# Patient Record
Sex: FEMALE | Race: BLACK OR AFRICAN AMERICAN | NOT HISPANIC OR LATINO | Employment: FULL TIME | URBAN - METROPOLITAN AREA
[De-identification: names, ages, dates, MRNs, and addresses within clinical notes are randomized per-mention and may not be internally consistent; named-entity substitution may affect disease eponyms.]

---

## 2023-10-09 DIAGNOSIS — E66.9 CLASS 2 OBESITY WITHOUT SERIOUS COMORBIDITY WITH BODY MASS INDEX (BMI) OF 35.0 TO 35.9 IN ADULT, UNSPECIFIED OBESITY TYPE: Primary | ICD-10-CM

## 2023-10-09 RX ORDER — PHENTERMINE HYDROCHLORIDE 37.5 MG/1
37.5 TABLET ORAL DAILY
Qty: 30 TABLET | Refills: 0 | Status: SHIPPED | OUTPATIENT
Start: 2023-10-09 | End: 2024-02-21 | Stop reason: SDUPTHER

## 2023-10-10 ENCOUNTER — PHARMACY VISIT (OUTPATIENT)
Dept: PHARMACY | Facility: CLINIC | Age: 53
End: 2023-10-10

## 2023-10-11 ENCOUNTER — PHARMACY VISIT (OUTPATIENT)
Dept: PHARMACY | Facility: CLINIC | Age: 53
End: 2023-10-11
Payer: COMMERCIAL

## 2023-10-11 PROCEDURE — RXMED WILLOW AMBULATORY MEDICATION CHARGE

## 2023-11-01 ENCOUNTER — PHARMACY VISIT (OUTPATIENT)
Dept: PHARMACY | Facility: CLINIC | Age: 53
End: 2023-11-01
Payer: COMMERCIAL

## 2023-11-01 DIAGNOSIS — N30.00 ACUTE CYSTITIS WITHOUT HEMATURIA: Primary | ICD-10-CM

## 2023-11-01 PROCEDURE — RXMED WILLOW AMBULATORY MEDICATION CHARGE

## 2023-11-01 RX ORDER — SULFAMETHOXAZOLE AND TRIMETHOPRIM 800; 160 MG/1; MG/1
1 TABLET ORAL 2 TIMES DAILY
Qty: 6 TABLET | Refills: 0 | Status: SHIPPED | OUTPATIENT
Start: 2023-11-01 | End: 2023-11-04

## 2023-11-11 PROBLEM — H52.203 ASTIGMATISM, BILATERAL: Status: ACTIVE | Noted: 2023-11-11

## 2023-11-11 PROBLEM — R73.03 PREDIABETES: Status: ACTIVE | Noted: 2023-11-11

## 2023-11-11 PROBLEM — E89.40 SURGICAL MENOPAUSE, ASYMPTOMATIC: Status: ACTIVE | Noted: 2023-11-11

## 2023-11-11 PROBLEM — L81.9 DISORDER OF PIGMENTATION, UNSPECIFIED: Status: ACTIVE | Noted: 2021-05-26

## 2023-11-11 PROBLEM — H52.03 HYPERMETROPIA OF BOTH EYES: Status: ACTIVE | Noted: 2023-11-11

## 2023-11-11 PROBLEM — R92.1 BREAST CALCIFICATION, RIGHT: Status: ACTIVE | Noted: 2023-11-11

## 2023-11-11 PROBLEM — E53.8 VITAMIN B12 DEFICIENCY: Status: ACTIVE | Noted: 2023-11-11

## 2023-11-11 PROBLEM — H52.4 BILATERAL PRESBYOPIA: Status: ACTIVE | Noted: 2023-11-11

## 2023-11-11 PROBLEM — E55.9 VITAMIN D DEFICIENCY: Status: ACTIVE | Noted: 2023-11-11

## 2023-11-11 PROBLEM — L44.1 LICHEN NITIDUS: Status: ACTIVE | Noted: 2021-05-26

## 2023-11-11 PROBLEM — M25.372 INSTABILITY OF LEFT ANKLE JOINT: Status: ACTIVE | Noted: 2023-11-11

## 2023-11-11 PROBLEM — R92.8 ABNORMAL FINDING ON MAMMOGRAPHY: Status: ACTIVE | Noted: 2023-11-11

## 2023-11-11 PROBLEM — D64.9 ANEMIA: Status: ACTIVE | Noted: 2023-11-11

## 2023-11-11 PROBLEM — K91.2 MALNUTRITION FOLLOWING GASTROINTESTINAL SURGERY (HHS-HCC): Status: ACTIVE | Noted: 2023-11-11

## 2023-11-11 PROBLEM — E66.9 OBESITY (BMI 30-39.9): Status: ACTIVE | Noted: 2023-11-11

## 2023-11-11 PROBLEM — R63.2 POLYPHAGIA: Status: ACTIVE | Noted: 2023-11-11

## 2023-11-11 PROBLEM — L81.1 CHLOASMA: Status: ACTIVE | Noted: 2021-05-26

## 2023-11-11 RX ORDER — LEVONORGESTREL 52 MG/1
INTRAUTERINE DEVICE INTRAUTERINE
COMMUNITY
End: 2023-11-13 | Stop reason: ALTCHOICE

## 2023-11-11 RX ORDER — OMEPRAZOLE 20 MG/1
1 CAPSULE, DELAYED RELEASE ORAL DAILY PRN
COMMUNITY

## 2023-11-11 RX ORDER — CALCIUM CARBONATE 200(500)MG
1 TABLET,CHEWABLE ORAL AS NEEDED
COMMUNITY

## 2023-11-11 RX ORDER — TRIAMCINOLONE ACETONIDE 1 MG/G
1 CREAM TOPICAL
COMMUNITY
Start: 2021-05-26

## 2023-11-11 RX ORDER — METFORMIN HYDROCHLORIDE 500 MG/1
1 TABLET ORAL EVERY 12 HOURS
COMMUNITY
Start: 2022-07-27

## 2023-11-11 RX ORDER — IBUPROFEN 200 MG
2 TABLET ORAL AS NEEDED
COMMUNITY
Start: 2019-09-26

## 2023-11-11 RX ORDER — ESTRADIOL 0.03 MG/D
FILM, EXTENDED RELEASE TRANSDERMAL 2 TIMES WEEKLY
COMMUNITY
Start: 2019-09-26 | End: 2023-11-13 | Stop reason: ALTCHOICE

## 2023-11-11 RX ORDER — ACETAMINOPHEN 500 MG
1 TABLET ORAL DAILY
COMMUNITY
Start: 2019-11-14

## 2023-11-11 RX ORDER — ERGOCALCIFEROL 1.25 MG/1
1 CAPSULE ORAL
COMMUNITY
Start: 2021-04-13

## 2023-11-11 RX ORDER — SEMAGLUTIDE 1.34 MG/ML
0.5 INJECTION, SOLUTION SUBCUTANEOUS
COMMUNITY
Start: 2022-08-09

## 2023-11-11 RX ORDER — ACETAMINOPHEN 325 MG/1
2 TABLET ORAL EVERY 6 HOURS PRN
COMMUNITY
Start: 2019-09-26

## 2023-11-13 ENCOUNTER — OFFICE VISIT (OUTPATIENT)
Dept: OBSTETRICS AND GYNECOLOGY | Facility: HOSPITAL | Age: 53
End: 2023-11-13
Payer: COMMERCIAL

## 2023-11-13 VITALS
DIASTOLIC BLOOD PRESSURE: 88 MMHG | SYSTOLIC BLOOD PRESSURE: 138 MMHG | BODY MASS INDEX: 37.22 KG/M2 | WEIGHT: 230.6 LBS | HEART RATE: 108 BPM

## 2023-11-13 DIAGNOSIS — Z01.419 ENCOUNTER FOR GYNECOLOGICAL EXAMINATION WITHOUT ABNORMAL FINDING: Primary | ICD-10-CM

## 2023-11-13 DIAGNOSIS — Z12.31 SCREENING MAMMOGRAM FOR BREAST CANCER: ICD-10-CM

## 2023-11-13 DIAGNOSIS — Z12.4 SCREENING FOR MALIGNANT NEOPLASM OF CERVIX: ICD-10-CM

## 2023-11-13 DIAGNOSIS — Z30.432 ENCOUNTER FOR IUD REMOVAL: ICD-10-CM

## 2023-11-13 PROCEDURE — 99396 PREV VISIT EST AGE 40-64: CPT | Performed by: OBSTETRICS & GYNECOLOGY

## 2023-11-13 PROCEDURE — 58301 REMOVE INTRAUTERINE DEVICE: CPT | Performed by: OBSTETRICS & GYNECOLOGY

## 2023-11-13 PROCEDURE — 1036F TOBACCO NON-USER: CPT | Performed by: OBSTETRICS & GYNECOLOGY

## 2023-11-13 PROCEDURE — 3008F BODY MASS INDEX DOCD: CPT | Performed by: OBSTETRICS & GYNECOLOGY

## 2023-11-13 PROCEDURE — 99386 PREV VISIT NEW AGE 40-64: CPT | Performed by: OBSTETRICS & GYNECOLOGY

## 2023-11-13 PROCEDURE — 87624 HPV HI-RISK TYP POOLED RSLT: CPT | Performed by: OBSTETRICS & GYNECOLOGY

## 2023-11-13 PROCEDURE — 88175 CYTOPATH C/V AUTO FLUID REDO: CPT | Mod: TC,GCY | Performed by: OBSTETRICS & GYNECOLOGY

## 2023-11-13 SDOH — ECONOMIC STABILITY: FOOD INSECURITY: WITHIN THE PAST 12 MONTHS, THE FOOD YOU BOUGHT JUST DIDN'T LAST AND YOU DIDN'T HAVE MONEY TO GET MORE.: NEVER TRUE

## 2023-11-13 SDOH — ECONOMIC STABILITY: FOOD INSECURITY: WITHIN THE PAST 12 MONTHS, YOU WORRIED THAT YOUR FOOD WOULD RUN OUT BEFORE YOU GOT MONEY TO BUY MORE.: NEVER TRUE

## 2023-11-13 ASSESSMENT — ENCOUNTER SYMPTOMS
CONSTITUTIONAL NEGATIVE: 0
CARDIOVASCULAR NEGATIVE: 0
PSYCHIATRIC NEGATIVE: 0
MUSCULOSKELETAL NEGATIVE: 0
HEMATOLOGIC/LYMPHATIC NEGATIVE: 0
NEUROLOGICAL NEGATIVE: 0
ENDOCRINE NEGATIVE: 0
ALLERGIC/IMMUNOLOGIC NEGATIVE: 0
RESPIRATORY NEGATIVE: 0
GASTROINTESTINAL NEGATIVE: 0
EYES NEGATIVE: 0

## 2023-11-13 ASSESSMENT — PATIENT HEALTH QUESTIONNAIRE - PHQ9
SUM OF ALL RESPONSES TO PHQ9 QUESTIONS 1 & 2: 0
2. FEELING DOWN, DEPRESSED OR HOPELESS: NOT AT ALL
1. LITTLE INTEREST OR PLEASURE IN DOING THINGS: NOT AT ALL

## 2023-11-13 ASSESSMENT — PAIN SCALES - GENERAL: PAINLEVEL: 0-NO PAIN

## 2023-11-13 NOTE — PROGRESS NOTES
Subjective   Patient ID: Bhavana Soria is a 53 y.o. female who presents for Annual Exam (Pt here for annual exam/Last pap 2/20/2019/Pt desires IUD removed placed in 2014 /Pt denies pain /Pt denies falls /).  53 year old patient, presents for annual gyn exam.   Wants IUD removed today. Placed 2014 (Mirena).  Due for Pap.  Hx of BSO for benign tumors.   Son just got out of service.         Review of Systems   All other systems reviewed and are negative.      Objective   Physical Exam  Vitals reviewed.   Constitutional:       Appearance: Normal appearance.   HENT:      Head: Normocephalic and atraumatic.      Right Ear: External ear normal.      Left Ear: External ear normal.      Nose: Nose normal.      Mouth/Throat:      Mouth: Mucous membranes are moist.   Eyes:      Extraocular Movements: Extraocular movements intact.   Neck:      Thyroid: No thyroid mass or thyromegaly.   Cardiovascular:      Rate and Rhythm: Normal rate and regular rhythm.      Heart sounds: Normal heart sounds.   Pulmonary:      Effort: Pulmonary effort is normal.      Breath sounds: Normal breath sounds.   Chest:   Breasts:     Right: Normal.      Left: Normal.   Abdominal:      General: Abdomen is flat. Bowel sounds are normal.      Palpations: Abdomen is soft.      Tenderness: There is no abdominal tenderness. There is no right CVA tenderness or left CVA tenderness.   Genitourinary:     General: Normal vulva.      Exam position: Lithotomy position.      Labia:         Right: No lesion.         Left: No lesion.       Urethra: No prolapse, urethral swelling or urethral lesion.      Vagina: Normal.      Cervix: Normal.      Uterus: Normal.       Adnexa: Right adnexa normal and left adnexa normal.   Musculoskeletal:         General: Normal range of motion.      Right shoulder: Normal.      Left shoulder: Normal.      Cervical back: Normal, normal range of motion and neck supple.      Thoracic back: Normal.      Lumbar back: Normal.      Right  hip: Normal.      Left hip: Normal.      Right upper leg: Normal.      Left upper leg: Normal.      Right knee: Normal.      Left knee: Normal.      Right lower leg: Normal.      Left lower leg: Normal.   Lymphadenopathy:      Upper Body:      Right upper body: No axillary adenopathy.      Left upper body: No axillary adenopathy.      Lower Body: No right inguinal adenopathy. No left inguinal adenopathy.   Skin:     General: Skin is warm and dry.   Neurological:      General: No focal deficit present.      Mental Status: She is alert and oriented to person, place, and time.      Cranial Nerves: Cranial nerves 2-12 are intact.      Motor: Motor function is intact.   Psychiatric:         Attention and Perception: Attention and perception normal.         Mood and Affect: Mood and affect normal.         Behavior: Behavior normal.         Cognition and Memory: Cognition normal.         Judgment: Judgment normal.     Patient ID: Bhavana Soria is a 53 y.o. female.    IUD Removal    Date/Time: 11/13/2023 12:33 PM    Performed by: Megan Avalos MD  Authorized by: Megan Avalos MD    Consent:     Consent obtained:  Written    Consent given by:  Patient    Procedure risks and benefits discussed: yes      Patient questions answered: yes      Patient agrees, verbalizes understanding, and wants to proceed: yes      Instructions and paperwork completed: yes    Universal protocol:     Patient states understanding of procedure being performed: yes      Relevant documents present and verified: yes      Required blood products, implants, devices, and special equipment available: yes    Procedure:     Removed with no complications: yes      Removal due to mechanical complications of IUD: no      Removal due to infection and inflammatory reaction: no        Assessment/Plan   Problem List Items Addressed This Visit    None  Visit Diagnoses         Codes    Encounter for gynecological examination without abnormal  finding    -  Primary Z01.419    Screening mammogram for breast cancer     Z12.31    Relevant Orders    BI mammo bilateral screening tomosynthesis    Screening for malignant neoplasm of cervix     Z12.4    Relevant Orders    THINPREP PAP TEST    Encounter for IUD removal     Z30.432    Relevant Orders    IUD Removal

## 2023-12-01 LAB
CYTOLOGY CMNT CVX/VAG CYTO-IMP: NORMAL
HPV HR 12 DNA GENITAL QL NAA+PROBE: NEGATIVE
HPV HR GENOTYPES PNL CVX NAA+PROBE: NEGATIVE
HPV16 DNA SPEC QL NAA+PROBE: NEGATIVE
HPV18 DNA SPEC QL NAA+PROBE: NEGATIVE
LAB AP CONTRACEPTIVE HISTORY: NORMAL
LAB AP HPV GENOTYPE QUESTION: YES
LAB AP HPV HR: NORMAL
LABORATORY COMMENT REPORT: NORMAL
MENSTRUAL HX REPORTED: NORMAL
PATH REPORT.TOTAL CANCER: NORMAL

## 2024-02-21 ENCOUNTER — TELEMEDICINE (OUTPATIENT)
Dept: PRIMARY CARE | Facility: CLINIC | Age: 54
End: 2024-02-21
Payer: COMMERCIAL

## 2024-02-21 ENCOUNTER — PHARMACY VISIT (OUTPATIENT)
Dept: PHARMACY | Facility: CLINIC | Age: 54
End: 2024-02-21

## 2024-02-21 VITALS — BODY MASS INDEX: 37.22 KG/M2 | HEIGHT: 66 IN

## 2024-02-21 DIAGNOSIS — E66.9 CLASS 2 OBESITY WITHOUT SERIOUS COMORBIDITY WITH BODY MASS INDEX (BMI) OF 35.0 TO 35.9 IN ADULT, UNSPECIFIED OBESITY TYPE: ICD-10-CM

## 2024-02-21 DIAGNOSIS — E66.9 OBESITY (BMI 30-39.9): Primary | ICD-10-CM

## 2024-02-21 PROCEDURE — 1036F TOBACCO NON-USER: CPT | Performed by: FAMILY MEDICINE

## 2024-02-21 PROCEDURE — RXMED WILLOW AMBULATORY MEDICATION CHARGE

## 2024-02-21 PROCEDURE — 99213 OFFICE O/P EST LOW 20 MIN: CPT | Performed by: FAMILY MEDICINE

## 2024-02-21 PROCEDURE — 3008F BODY MASS INDEX DOCD: CPT | Performed by: FAMILY MEDICINE

## 2024-02-21 RX ORDER — PHENTERMINE HYDROCHLORIDE 37.5 MG/1
37.5 TABLET ORAL DAILY
Qty: 30 TABLET | Refills: 0 | Status: SHIPPED | OUTPATIENT
Start: 2024-02-21 | End: 2024-03-19 | Stop reason: SDUPTHER

## 2024-02-21 RX ORDER — ELECTROLYTES/DEXTROSE
SOLUTION, ORAL ORAL
COMMUNITY

## 2024-02-21 ASSESSMENT — ENCOUNTER SYMPTOMS
DEPRESSION: 0
LOSS OF SENSATION IN FEET: 0
OCCASIONAL FEELINGS OF UNSTEADINESS: 0

## 2024-02-21 NOTE — PROGRESS NOTES
Chief Complaint:  No chief complaint on file.  History Of Present Illness:   Bhavana Soria is a 53 y.o. female        Bhavana Soria is a 53 year old female  employee presenting for obesity.            B12: she's been seen in the past for B12 injections.           Weight: on phentermine from bariatric team. tried saxenda in the past. Aug 9, 2022 update: she tried metformin, but was having some GI side effects. agreed to start ozempic rx. 2023 update: ozempic not working. agreed to restart phentermine for 3 months.   2023 update: weight lowest 223 recently, maintaining up to 227 lbs. agreed on this month plus one more month only this year for refill. discussed possible blood pressure and cardiac side effects, pt voiced understanding and awareness. 2024 update: 228-230 lbs. She wasn't to lose weight, she's been more active recently. Agreed to restart phenteramine for another 4 months max.      Prediabetes: HgA1c: 6.2% 2022.      Cholesterol: mildly elevated.      PMH: , 3 ectopic pregnancies. obesity.      PSH: partial hysterectomy, ectopic pregnancies.      SH: works for [employer]. smoked for about 22 years, nonsmoker now.      FH: mother: A fib. unknown dad's side. HTN. cholesterol. lymphedema.      COVID: vaccinated. never tested positive.      Colonoscopy: never had one.                        Review of Systems:     Negative  Constitutional:   - fever   - chills   - night sweats  - unexpected weight change  - changes in sleep     Eyes:   - loss of vision  - double vision  - floaters     Ear/Nose/Throat/Mouth:   - sore throat  - hearing changes  - sinus congestion     Cardiovascular:   - chest pain  - chest heaviness  - palpitations  - swelling in ankles     Musculoskeletal:   - bone pain  - muscle pain  - joint pain     Respiratory:   - shortness of breath  - difficulty breathing  - frequent cough  - wheezing     Neurological:   - loss of consciousness  -  "tremors  - dizzy spells  - numbness   - tingling     Gastrointestinal:   - abdominal pain  - nausea  - vomiting  - constipation  - diarrhea  - bloody stools  - loss of bowel control  - indigestion  - heartburn  - sour taste in throat when waking up     Genitourinary:   - urinary incontinence  - increased urinary frequency  - painful urination  - blood in urine     Skin:   - Rash  - lumps or bumps  - worrisome moles     Endocrine:   - excessive thirst  - feeling too hot  - feeling too cold  - feeling tired  - fatigue      Hematologic/Lymphatic:   - swollen glands  - blood clotting problems  - easy bruising.     Psychological:   - feelings of depression  - feelings of anxiety.     Sexual:   - sexual health concerns        Positive  Psychological:   - feeling generally happy  - feeling safe at home            Last Recorded Vitals:  Vitals:    02/21/24 1022   Height: 1.676 m (5' 6\")        Past Medical History:  She has a past medical history of Cutaneous abscess of right axilla (11/28/2016), Cutaneous abscess, unspecified (11/28/2016), Encounter for other preprocedural examination (07/30/2019), Encounter for pregnancy test, result negative (02/20/2019), Encounter for screening for infections with a predominantly sexual mode of transmission (03/19/2018), Encounter for screening for infections with a predominantly sexual mode of transmission (03/17/2017), Inappropriate diet and eating habits (11/30/2018), Other problems related to lifestyle, Other specified conditions associated with female genital organs and menstrual cycle (08/05/2019), Personal history of other diseases of the female genital tract (07/05/2019), Personal history of other medical treatment, Personal history of other specified conditions (11/07/2019), Solitary cyst of left breast (04/19/2017), Sprain of ligaments of lumbar spine, initial encounter (11/28/2016), Sprain of ligaments of lumbar spine, initial encounter (11/28/2016), Unspecified ovarian cyst, " "right side (02/20/2019), and Unspecified symptoms and signs involving the genitourinary system (04/23/2020).     Past Surgical History:  She has a past surgical history that includes Other surgical history (03/17/2017) and Other surgical history (10/10/2019).     Social History:  She reports that she has never smoked. She has never used smokeless tobacco. No history on file for alcohol use and drug use.     Family History:  Family History   Problem Relation Name Age of Onset    Atrial fibrillation Mother      Hyperlipidemia Mother      Hypertension Mother      Thyroid disease Mother       Allergies:  Penicillins and Sulfa (sulfonamide antibiotics)     Outpatient Medications:  Current Outpatient Medications   Medication Instructions    acetaminophen (Tylenol) 325 mg tablet 2 tablets, oral, Every 6 hours PRN    calcium carbonate (Tums) 200 mg calcium chewable tablet 1 tablet, oral, As needed    cetirizine (ZyrTEC) 10 mg capsule 1 capsule, oral, Daily PRN    cholecalciferol (Vitamin D-3) 50 mcg (2,000 unit) capsule 1 capsule, oral, Daily    ergocalciferol (Vitamin D-2) 1.25 MG (70951 UT) capsule 1 capsule, oral, Weekly    ibuprofen (Motrin IB) 200 mg tablet 2 tablets, oral, As needed    metFORMIN (Glucophage) 500 mg tablet 1 tablet, oral, Every 12 hours    omeprazole (PriLOSEC) 20 mg DR capsule 1 capsule, oral, Daily PRN    Ozempic 0.5 mg, subcutaneous, Weekly    phentermine (Adipex-P) 37.5 mg tablet TAKE 1 TABLET BY MOUTH ONCE DAILY    syringe with needle 3 mL 23 x 1\" syringe USE AS DIRECTED for B12 injection    triamcinolone (Kenalog) 0.1 % cream 1 Application, Topical        Physical Exam:  GENERAL: Well developed, well nourished, alert and cooperative, and appears to be in no acute distress.     PSYCH: mood pleasant and appropriate     LUNGS: Good respiratory effort.     GAIT: Normal         Assessment/Plan   Problem List Items Addressed This Visit             ICD-10-CM    Obesity (BMI 30-39.9) - Primary E66.9 "     Other Visit Diagnoses         Codes    Class 2 obesity without serious comorbidity with body mass index (BMI) of 35.0 to 35.9 in adult, unspecified obesity type     E66.9, Z68.35            Agreed to restart max 4 month trial phentermine.    30 days sent to pharmacy.     Follow up as needed.         Home Chin, DO

## 2024-03-18 ENCOUNTER — PHARMACY VISIT (OUTPATIENT)
Dept: PHARMACY | Facility: CLINIC | Age: 54
End: 2024-03-18
Payer: COMMERCIAL

## 2024-03-18 PROCEDURE — 88305 TISSUE EXAM BY PATHOLOGIST: CPT

## 2024-03-18 PROCEDURE — 88305 TISSUE EXAM BY PATHOLOGIST: CPT | Performed by: PATHOLOGY

## 2024-03-18 PROCEDURE — RXMED WILLOW AMBULATORY MEDICATION CHARGE

## 2024-03-18 RX ORDER — ERYTHROMYCIN 5 MG/G
OINTMENT OPHTHALMIC
Qty: 3.5 G | Refills: 0 | OUTPATIENT
Start: 2024-03-18

## 2024-03-19 DIAGNOSIS — E66.9 CLASS 2 OBESITY WITHOUT SERIOUS COMORBIDITY WITH BODY MASS INDEX (BMI) OF 35.0 TO 35.9 IN ADULT, UNSPECIFIED OBESITY TYPE: ICD-10-CM

## 2024-03-19 RX ORDER — PHENTERMINE HYDROCHLORIDE 37.5 MG/1
37.5 TABLET ORAL DAILY
Qty: 30 TABLET | Refills: 0 | Status: SHIPPED | OUTPATIENT
Start: 2024-03-19 | End: 2024-04-14 | Stop reason: SDUPTHER

## 2024-03-20 ENCOUNTER — LAB REQUISITION (OUTPATIENT)
Dept: LAB | Facility: HOSPITAL | Age: 54
End: 2024-03-20
Payer: COMMERCIAL

## 2024-03-20 ENCOUNTER — PHARMACY VISIT (OUTPATIENT)
Dept: PHARMACY | Facility: CLINIC | Age: 54
End: 2024-03-20

## 2024-03-20 DIAGNOSIS — D48.5 NEOPLASM OF UNCERTAIN BEHAVIOR OF SKIN: ICD-10-CM

## 2024-03-20 PROCEDURE — RXMED WILLOW AMBULATORY MEDICATION CHARGE

## 2024-03-25 LAB
LABORATORY COMMENT REPORT: NORMAL
PATH REPORT.FINAL DX SPEC: NORMAL
PATH REPORT.GROSS SPEC: NORMAL
PATH REPORT.RELEVANT HX SPEC: NORMAL
PATH REPORT.TOTAL CANCER: NORMAL

## 2024-04-14 DIAGNOSIS — E66.9 CLASS 2 OBESITY WITHOUT SERIOUS COMORBIDITY WITH BODY MASS INDEX (BMI) OF 35.0 TO 35.9 IN ADULT, UNSPECIFIED OBESITY TYPE: ICD-10-CM

## 2024-04-17 RX ORDER — PHENTERMINE HYDROCHLORIDE 37.5 MG/1
37.5 TABLET ORAL DAILY
Qty: 30 TABLET | Refills: 0 | Status: SHIPPED | OUTPATIENT
Start: 2024-04-17 | End: 2024-05-14 | Stop reason: SDUPTHER

## 2024-04-18 ENCOUNTER — PHARMACY VISIT (OUTPATIENT)
Dept: PHARMACY | Facility: CLINIC | Age: 54
End: 2024-04-18

## 2024-04-18 PROCEDURE — RXMED WILLOW AMBULATORY MEDICATION CHARGE

## 2024-05-14 DIAGNOSIS — E66.9 CLASS 2 OBESITY WITHOUT SERIOUS COMORBIDITY WITH BODY MASS INDEX (BMI) OF 35.0 TO 35.9 IN ADULT, UNSPECIFIED OBESITY TYPE: ICD-10-CM

## 2024-05-14 RX ORDER — PHENTERMINE HYDROCHLORIDE 37.5 MG/1
37.5 TABLET ORAL DAILY
Qty: 30 TABLET | Refills: 0 | Status: SHIPPED | OUTPATIENT
Start: 2024-05-14 | End: 2024-06-16

## 2024-05-17 ENCOUNTER — PHARMACY VISIT (OUTPATIENT)
Dept: PHARMACY | Facility: CLINIC | Age: 54
End: 2024-05-17

## 2024-05-17 PROCEDURE — RXMED WILLOW AMBULATORY MEDICATION CHARGE

## 2024-08-29 ENCOUNTER — APPOINTMENT (OUTPATIENT)
Dept: PRIMARY CARE | Facility: CLINIC | Age: 54
End: 2024-08-29
Payer: COMMERCIAL

## 2024-08-29 VITALS
DIASTOLIC BLOOD PRESSURE: 86 MMHG | HEART RATE: 80 BPM | BODY MASS INDEX: 35.81 KG/M2 | SYSTOLIC BLOOD PRESSURE: 134 MMHG | WEIGHT: 222.8 LBS | HEIGHT: 66 IN

## 2024-08-29 DIAGNOSIS — Z00.00 WELL ADULT EXAM: Primary | ICD-10-CM

## 2024-08-29 LAB
ERYTHROCYTE [DISTWIDTH] IN BLOOD BY AUTOMATED COUNT: 16.4 % (ref 11.5–14.5)
EST. AVERAGE GLUCOSE BLD GHB EST-MCNC: 120 MG/DL
HBA1C MFR BLD: 5.8 %
HCT VFR BLD AUTO: 40.1 % (ref 36–46)
HGB BLD-MCNC: 11.8 G/DL (ref 12–16)
MCH RBC QN AUTO: 22 PG (ref 26–34)
MCHC RBC AUTO-ENTMCNC: 29.4 G/DL (ref 32–36)
MCV RBC AUTO: 75 FL (ref 80–100)
NRBC BLD-RTO: 0 /100 WBCS (ref 0–0)
PLATELET # BLD AUTO: 297 X10*3/UL (ref 150–450)
RBC # BLD AUTO: 5.36 X10*6/UL (ref 4–5.2)
WBC # BLD AUTO: 5 X10*3/UL (ref 4.4–11.3)

## 2024-08-29 PROCEDURE — 83036 HEMOGLOBIN GLYCOSYLATED A1C: CPT

## 2024-08-29 PROCEDURE — 99396 PREV VISIT EST AGE 40-64: CPT | Performed by: FAMILY MEDICINE

## 2024-08-29 PROCEDURE — 84443 ASSAY THYROID STIM HORMONE: CPT

## 2024-08-29 PROCEDURE — 1036F TOBACCO NON-USER: CPT | Performed by: FAMILY MEDICINE

## 2024-08-29 PROCEDURE — 80061 LIPID PANEL: CPT

## 2024-08-29 PROCEDURE — 80053 COMPREHEN METABOLIC PANEL: CPT

## 2024-08-29 PROCEDURE — 3008F BODY MASS INDEX DOCD: CPT | Performed by: FAMILY MEDICINE

## 2024-08-29 PROCEDURE — 85027 COMPLETE CBC AUTOMATED: CPT

## 2024-08-29 ASSESSMENT — ENCOUNTER SYMPTOMS
DEPRESSION: 0
LOSS OF SENSATION IN FEET: 0
OCCASIONAL FEELINGS OF UNSTEADINESS: 0

## 2024-08-29 NOTE — PROGRESS NOTES
Pt is here for annual, concerns of resuming Phentermine.       Chief Complaint:  No chief complaint on file.  History Of Present Illness:   Bhavana Soria is a 54 y.o. 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            B12: she's been seen in the past for B12 injections.            Weight: on phentermine from bariatric team. tried saxenda in the past. Aug 9, 2022 update: she tried metformin, but was having some GI side effects. agreed to start ozempic rx. 2023 update: ozempic not working. agreed to restart phentermine for 3 months.   2023 update: weight lowest 223 recently, maintaining up to 227 lbs. agreed on this month plus one more month only this year for refill. discussed possible blood pressure and cardiac side effects, pt voiced understanding and awareness. 2024 update: 228-230 lbs. She wasn't to lose weight, she's been more active recently. Agreed to restart phenteramine for another 4 months max.   Aug 2024 update: 2024 was 4th refill. 222 lbs today. We discussed that if her blood work was good (no diabetes, reasonable cholesterol) then we could start adipex again for 4 months max, Oct 1, 2024. Pt warned about potential bp/cardiac effects.     Weight loss history:  - saxenda- failed.   - ozempic- made her tired. No personal history of thyroid cancer or MEN.   - topamax failed.      Prediabetes: HgA1c: 6.2% 2022.      Cholesterol: mildly elevated.       Healthcare team:  - GYN       PMH: , 3 ectopic pregnancies. obesity.      PSH: partial hysterectomy, ectopic pregnancies.      SH: works for [employer]. smoked 1/2 ppd for 22 years, nonsmoker now.      FH: mother: A fib. unknown dad's side. HTN. cholesterol. lymphedema.      COVID: vaccinated. never tested positive.      Colonoscopy: never had one.  Ordered today.    Mammogram: managed by GYN per pt. Will order.     Exercise: walks with her .            "  Review of Systems (BOLD if positive, delete if not asked):     Constitutional:   - fever   - chills   - night sweats  - unexpected weight change  - changes in sleep     Eyes:   - loss of vision  - double vision  - floaters     Ear/Nose/Throat/Mouth:   - sore throat  - hearing changes  - sinus congestion     Cardiovascular:   - chest pain  - chest heaviness  - palpitations  - swelling in ankles     Musculoskeletal:   - bone pain  - muscle pain  - joint pain     Respiratory:   - shortness of breath  - difficulty breathing  - frequent cough  - wheezing     Neurological:   - loss of consciousness  - tremors  - dizzy spells  - numbness   - tingling     Gastrointestinal:   - abdominal pain  - nausea  - vomiting  - constipation  - diarrhea  - bloody stools  - loss of bowel control  - indigestion  - heartburn  - sour taste in throat when waking up     Genitourinary:   - urinary incontinence  - increased urinary frequency  - painful urination  - blood in urine     Skin:   - Rash  - lumps or bumps  - worrisome moles     Endocrine:   - excessive thirst  - feeling too hot  - feeling too cold  - feeling tired  - fatigue    Hematologic/Lymphatic:   - swollen glands  - blood clotting problems  - easy bruising.     Psychological:   - feelings of depression  - feelings of anxiety.     Sexual:   - sexual health concerns      Psychological:   - feeling generally happy  - feeling safe at home          Last Recorded Vitals:  Vitals:    08/29/24 0902   BP: 134/86   Pulse: 80   Weight: 101 kg (222 lb 12.8 oz)   Height: 1.676 m (5' 6\")        Past Medical History:  She has a past medical history of Cutaneous abscess of right axilla (11/28/2016), Cutaneous abscess, unspecified (11/28/2016), Encounter for other preprocedural examination (07/30/2019), Encounter for pregnancy test, result negative (02/20/2019), Encounter for screening for infections with a predominantly sexual mode of transmission (03/19/2018), Encounter for screening for " infections with a predominantly sexual mode of transmission (03/17/2017), Inappropriate diet and eating habits (11/30/2018), Other problems related to lifestyle, Other specified conditions associated with female genital organs and menstrual cycle (08/05/2019), Personal history of other diseases of the female genital tract (07/05/2019), Personal history of other medical treatment, Personal history of other specified conditions (11/07/2019), Solitary cyst of left breast (04/19/2017), Sprain of ligaments of lumbar spine, initial encounter (11/28/2016), Sprain of ligaments of lumbar spine, initial encounter (11/28/2016), Unspecified ovarian cyst, right side (02/20/2019), and Unspecified symptoms and signs involving the genitourinary system (04/23/2020).     Past Surgical History:  She has a past surgical history that includes Other surgical history (03/17/2017) and Other surgical history (10/10/2019).     Social History:  She reports that she has never smoked. She has never used smokeless tobacco. No history on file for alcohol use and drug use.     Family History:  Family History   Problem Relation Name Age of Onset    Atrial fibrillation Mother      Hyperlipidemia Mother      Hypertension Mother      Thyroid disease Mother       Allergies:  Penicillins and Sulfa (sulfonamide antibiotics)     Outpatient Medications:  Current Outpatient Medications   Medication Instructions    acetaminophen (Tylenol) 325 mg tablet 2 tablets, oral, Every 6 hours PRN    biotin 5 mg capsule oral, Daily RT    calcium carbonate (Tums) 200 mg calcium chewable tablet 1 tablet, oral, As needed    cetirizine (ZyrTEC) 10 mg capsule 1 capsule, oral, Daily PRN    cholecalciferol (Vitamin D-3) 50 mcg (2,000 unit) capsule 1 capsule, oral, Daily    ergocalciferol (Vitamin D-2) 1.25 MG (31291 UT) capsule 1 capsule, oral, Once Weekly    erythromycin (Romycin) 5 mg/gram (0.5 %) ophthalmic ointment Apply 0.25 inch of ointment to left upper eyelid 3 times  "a day    ibuprofen (Motrin IB) 200 mg tablet 2 tablets, oral, As needed    metFORMIN (Glucophage) 500 mg tablet 1 tablet, oral, Every 12 hours    naltrexone-bupropion (Contrave) 8-90 mg ER tablet 2 tablets, oral, 2 times daily    omeprazole (PriLOSEC) 20 mg DR capsule 1 capsule, oral, Daily PRN    Ozempic 0.5 mg, subcutaneous, Once Weekly    phentermine (Adipex-P) 37.5 mg tablet TAKE 1 TABLET BY MOUTH ONCE DAILY    syringe with needle 3 mL 23 x 1\" syringe USE AS DIRECTED for B12 injection    triamcinolone (Kenalog) 0.1 % cream 1 Application, Topical        Physical Exam:  GENERAL: Well developed, well nourished, alert and cooperative, and appears to be in no acute distress.     PSYCH: mood pleasant and appropriate     HEAD: normocephalic     EYES: PERRL, EOMI. vision is grossly intact.        NOSE:  Nares patent b/l.   No bleeding nasal polyps. No nasal discharge.     THROAT:    Oropharynx clear.  No inflammation, swelling, exudate, or lesions.   Teeth and gingiva in good general condition.     NECK: Neck supple, non-tender.   No masses, no thyromegaly.  No anterior cervical lymphadenopathy.     CARDIAC:   RRR.   No murmur.     LUNGS: Good respiratory effort.  Clear to auscultation b/l without rales, rhonchi, wheezing.     ABD: soft, nontender  no masses palpated.   No HSM. No R/G.  No CVA tenderness to palpation b/l     GAIT: Normal              EXTREMITIES: All 4 extremities are warm and well perfused.   Peripheral pulses intact.   No varicosities.   No cyanosis, no pallor.   No peripheral edema.     NEUROLOGICAL: Strength and sensation symmetric and intact throughout all 4 extremities.  CN II-XII grossly intact.  Cerebellar testing normal. Negative Rhomberg's test. No dysdiadochokinesis.  DTR 2+ in all 4 extremities.     SKIN: Skin normal color  no lesions or eruptions.          Last Labs:  CBC -  Lab Results   Component Value Date    WBC 6.9 07/25/2022    HGB 11.5 (L) 07/25/2022    HCT 36.6 07/25/2022    MCV 72 " (L) 07/25/2022     07/25/2022        CMP -  Lab Results   Component Value Date    CALCIUM 10.1 07/25/2022    PROT 7.5 07/25/2022    ALBUMIN 4.3 07/25/2022    AST 14 07/25/2022    ALT 22 07/25/2022    ALKPHOS 73 07/25/2022    BILITOT 0.4 07/25/2022        LIPID PANEL -  Lab Results   Component Value Date    CHOL 219 (H) 07/25/2022    TRIG 90 07/25/2022    HDL 52.3 07/25/2022    CHHDL 4.2 07/25/2022    LDLF 149 (H) 07/25/2022    VLDL 18 07/25/2022           Lab Results   Component Value Date    HGBA1C 6.2 (A) 07/25/2022          CT ABDOMEN PELVIS W IV CONTRAST  MRN: [id number]  Patient Name: RACHEL ALMONTE     STUDY:  BD CT ABDOMEN AND PELVIS WITH CONTRAST; 8/2/2019 10:32 am     INDICATION:  Adnexal mass. CEA at 0.6.     COMPARISON:  None     ACCESSION NUMBER(S):  [id number]     ORDERING CLINICIAN:  VALENCIA PARKER     TECHNIQUE:  CT of the abdomen and pelvis was performed. Contiguous axial images  were obtained at 3 mm slice thickness through the abdomen and pelvis.  Coronal and sagittal reconstructions at 3 mm slice thickness were  performed. No intravenous contrast was administered; positive oral  contrast was given.     FINDINGS:  Please note that the evaluation of vessels, lymph nodes and organs is  limited without intravenous contrast.     LOWER CHEST:  The visualized lung base is unremarkable. The heart is normal in size  without evidence of pericardial effusion. No pleural effusion is  present. Visualized distal esophagus appears normal.     ABDOMEN:     LIVER:  The liver is normal in size. There is diffuse hepatic steatosis.  There is an additional focal area of hepatic steatosis along the  falciform ligament.     BILE DUCTS:  The intrahepatic and extrahepatic ducts are not dilated.     GALLBLADDER:  The gallbladder is nondistended and without evidence of radiopaque  stones.     PANCREAS:  The pancreas appears unremarkable without evidence of ductal  dilatation or masses.     SPLEEN:  The spleen is  normal in size without focal lesions.     ADRENAL GLANDS:  Bilateral adrenal glands appear normal.     KIDNEYS AND URETERS:  The kidneys are normal in size and unremarkable in appearance. No  hydroureteronephrosis or nephroureterolithiasis is identified.     PELVIS:     BLADDER:  The urinary bladder appears normal without abnormal wall thickening.     REPRODUCTIVE ORGANS:  An anteverted uterus is present. A satisfactory positioned  intrauterine device is present. There is a left-sided multi cystic  adnexal mass as well as a predominantly unilocular right adnexal  cystic mass with a solid nodular component.. The right adnexal mass  is larger than the left multicystic adnexal mass. The cystic  component of the right adnexal mass measures at approximately 7.3 x 7  x 7.5 cm in AP by transverse by craniocaudal dimensions. The solid  component of the right adnexal mass measures at approximately 4.7 x 3  x 3.6 cm in AP by transverse by craniocaudal dimensions. The left  multi cystic adnexal mass measures at approximately 7.2 x 3.4 x 4.8  cm in AP by transverse by craniocaudal dimensions and demonstrates  peripherally enhancing solid nodular components.     BOWEL:  The stomach, small and large bowel are normal in appearance without  wall thickening or obstruction. The appendix appears normal.     VESSELS:  There is no aneurysmal dilatation of the abdominal aorta. The IVC  appears normal.     PERITONEUM/RETROPERITONEUM/LYMPH NODES:  There is no free or loculated fluid collection, no free  intraperitoneal air. The retroperitoneum appears normal. No  abdominopelvic lymphadenopathy is present.     ABDOMINAL WALL:  The abdominal wall soft tissues appear normal.     BONES:  No suspicious osseous lesions are identified.     IMPRESSION:  1. Bilateral predominantly cystic complex adnexal masses with solid  nodular components is suspicious for an underlying neoplastic process  such as an epithelial ovarian neoplasm. No  abdominopelvic  lymphadenopathy or peritoneal disease identified.  2. Diffuse hepatic steatosis. Correlate with LFTs.     I personally reviewed the images/study and I agree with the findings  as stated. This study was interpreted at [facility], [place], [place].         Assessment/Plan   Problem List Items Addressed This Visit    None  Visit Diagnoses         Codes    Well adult exam    -  Primary Z00.00             Fasting lab work was ordered today. It is likely that your insurance will cover the testing, but if you have any concerns- please check with your insurance company before getting the blood work drawn. I will call you when I get the results.   Please do not eat for 12 hours before getting your blood work drawn (water is ok).     Mammogram and colonoscopy ordered.    Regarding weight loss- if your blood work is good- we can start adipex again Oct 1, 2024 for 4 months max. E-consent signed 8/29/24.     Follow up annually or as needed.      Home Chin, DO

## 2024-08-30 DIAGNOSIS — E78.49 OTHER HYPERLIPIDEMIA: Primary | ICD-10-CM

## 2024-08-30 LAB
ALBUMIN SERPL BCP-MCNC: 4.7 G/DL (ref 3.4–5)
ALP SERPL-CCNC: 70 U/L (ref 33–110)
ALT SERPL W P-5'-P-CCNC: 22 U/L (ref 7–45)
ANION GAP SERPL CALC-SCNC: 15 MMOL/L (ref 10–20)
AST SERPL W P-5'-P-CCNC: 16 U/L (ref 9–39)
BILIRUB SERPL-MCNC: 0.4 MG/DL (ref 0–1.2)
BUN SERPL-MCNC: 16 MG/DL (ref 6–23)
CALCIUM SERPL-MCNC: 10 MG/DL (ref 8.6–10.6)
CHLORIDE SERPL-SCNC: 105 MMOL/L (ref 98–107)
CHOLEST SERPL-MCNC: 257 MG/DL (ref 0–199)
CHOLESTEROL/HDL RATIO: 4.1
CO2 SERPL-SCNC: 27 MMOL/L (ref 21–32)
CREAT SERPL-MCNC: 0.82 MG/DL (ref 0.5–1.05)
EGFRCR SERPLBLD CKD-EPI 2021: 85 ML/MIN/1.73M*2
GLUCOSE SERPL-MCNC: 92 MG/DL (ref 74–99)
HDLC SERPL-MCNC: 62.3 MG/DL
LDLC SERPL CALC-MCNC: 181 MG/DL
NON HDL CHOLESTEROL: 195 MG/DL (ref 0–149)
POTASSIUM SERPL-SCNC: 4.5 MMOL/L (ref 3.5–5.3)
PROT SERPL-MCNC: 7.7 G/DL (ref 6.4–8.2)
SODIUM SERPL-SCNC: 142 MMOL/L (ref 136–145)
TRIGL SERPL-MCNC: 71 MG/DL (ref 0–149)
TSH SERPL-ACNC: 1.54 MIU/L (ref 0.44–3.98)
VLDL: 14 MG/DL (ref 0–40)

## 2024-08-30 PROCEDURE — RXMED WILLOW AMBULATORY MEDICATION CHARGE

## 2024-08-30 RX ORDER — ATORVASTATIN CALCIUM 10 MG/1
10 TABLET, FILM COATED ORAL DAILY
Qty: 90 TABLET | Refills: 1 | Status: SHIPPED | OUTPATIENT
Start: 2024-08-30 | End: 2025-02-26

## 2024-09-05 ENCOUNTER — PHARMACY VISIT (OUTPATIENT)
Dept: PHARMACY | Facility: CLINIC | Age: 54
End: 2024-09-05
Payer: COMMERCIAL

## 2024-09-25 ENCOUNTER — TELEPHONE (OUTPATIENT)
Dept: PRIMARY CARE | Facility: CLINIC | Age: 54
End: 2024-09-25
Payer: COMMERCIAL

## 2024-09-25 ENCOUNTER — PHARMACY VISIT (OUTPATIENT)
Dept: PHARMACY | Facility: CLINIC | Age: 54
End: 2024-09-25

## 2024-09-25 DIAGNOSIS — E66.9 CLASS 2 OBESITY WITHOUT SERIOUS COMORBIDITY WITH BODY MASS INDEX (BMI) OF 35.0 TO 35.9 IN ADULT, UNSPECIFIED OBESITY TYPE: ICD-10-CM

## 2024-09-25 PROCEDURE — RXMED WILLOW AMBULATORY MEDICATION CHARGE

## 2024-09-25 RX ORDER — PHENTERMINE HYDROCHLORIDE 37.5 MG/1
37.5 TABLET ORAL DAILY
Qty: 30 TABLET | Refills: 0 | Status: SHIPPED | OUTPATIENT
Start: 2024-09-25 | End: 2024-10-25

## 2024-09-27 ENCOUNTER — TELEPHONE (OUTPATIENT)
Dept: PRIMARY CARE | Facility: CLINIC | Age: 54
End: 2024-09-27
Payer: COMMERCIAL

## 2024-10-21 DIAGNOSIS — E66.812 CLASS 2 OBESITY WITHOUT SERIOUS COMORBIDITY WITH BODY MASS INDEX (BMI) OF 35.0 TO 35.9 IN ADULT, UNSPECIFIED OBESITY TYPE: ICD-10-CM

## 2024-10-22 RX ORDER — PHENTERMINE HYDROCHLORIDE 37.5 MG/1
37.5 TABLET ORAL DAILY
Qty: 30 TABLET | Refills: 0 | Status: SHIPPED | OUTPATIENT
Start: 2024-10-22 | End: 2024-11-22

## 2024-10-23 ENCOUNTER — PHARMACY VISIT (OUTPATIENT)
Dept: PHARMACY | Facility: CLINIC | Age: 54
End: 2024-10-23

## 2024-10-23 PROCEDURE — RXMED WILLOW AMBULATORY MEDICATION CHARGE

## 2024-11-14 ENCOUNTER — APPOINTMENT (OUTPATIENT)
Dept: OBSTETRICS AND GYNECOLOGY | Facility: CLINIC | Age: 54
End: 2024-11-14
Payer: COMMERCIAL

## 2024-11-20 DIAGNOSIS — E66.812 CLASS 2 OBESITY WITHOUT SERIOUS COMORBIDITY WITH BODY MASS INDEX (BMI) OF 35.0 TO 35.9 IN ADULT, UNSPECIFIED OBESITY TYPE: ICD-10-CM

## 2024-11-20 RX ORDER — PHENTERMINE HYDROCHLORIDE 37.5 MG/1
37.5 TABLET ORAL DAILY
Qty: 30 TABLET | Refills: 0 | OUTPATIENT
Start: 2024-11-20 | End: 2024-12-20

## 2024-11-21 ENCOUNTER — TELEPHONE (OUTPATIENT)
Dept: PRIMARY CARE | Facility: CLINIC | Age: 54
End: 2024-11-21
Payer: COMMERCIAL

## 2024-11-21 ENCOUNTER — PHARMACY VISIT (OUTPATIENT)
Dept: PHARMACY | Facility: CLINIC | Age: 54
End: 2024-11-21

## 2024-11-21 DIAGNOSIS — E66.812 CLASS 2 OBESITY WITHOUT SERIOUS COMORBIDITY WITH BODY MASS INDEX (BMI) OF 35.0 TO 35.9 IN ADULT, UNSPECIFIED OBESITY TYPE: ICD-10-CM

## 2024-11-21 PROCEDURE — RXMED WILLOW AMBULATORY MEDICATION CHARGE

## 2024-11-21 RX ORDER — PHENTERMINE HYDROCHLORIDE 37.5 MG/1
37.5 TABLET ORAL DAILY
Qty: 30 TABLET | Refills: 0 | Status: SHIPPED | OUTPATIENT
Start: 2024-11-21 | End: 2024-12-21

## 2025-02-21 ENCOUNTER — APPOINTMENT (OUTPATIENT)
Dept: OBSTETRICS AND GYNECOLOGY | Facility: CLINIC | Age: 55
End: 2025-02-21
Payer: COMMERCIAL

## 2025-02-21 VITALS — SYSTOLIC BLOOD PRESSURE: 128 MMHG | DIASTOLIC BLOOD PRESSURE: 92 MMHG

## 2025-02-21 DIAGNOSIS — Z01.419 WELL WOMAN EXAM WITH ROUTINE GYNECOLOGICAL EXAM: Primary | ICD-10-CM

## 2025-02-21 PROCEDURE — 1036F TOBACCO NON-USER: CPT | Performed by: OBSTETRICS & GYNECOLOGY

## 2025-02-21 PROCEDURE — 99396 PREV VISIT EST AGE 40-64: CPT | Performed by: OBSTETRICS & GYNECOLOGY

## 2025-02-21 ASSESSMENT — ENCOUNTER SYMPTOMS
ALLERGIC/IMMUNOLOGIC NEGATIVE: 0
HEMATOLOGIC/LYMPHATIC NEGATIVE: 0
GASTROINTESTINAL NEGATIVE: 0
EYES NEGATIVE: 0
MUSCULOSKELETAL NEGATIVE: 0
PSYCHIATRIC NEGATIVE: 0
ENDOCRINE NEGATIVE: 0
NEUROLOGICAL NEGATIVE: 0
CARDIOVASCULAR NEGATIVE: 0
RESPIRATORY NEGATIVE: 0
CONSTITUTIONAL NEGATIVE: 0

## 2025-02-21 NOTE — PROGRESS NOTES
Subjective   Patient ID: Bhavana Soria is a 54 y.o. female who presents for Annual Exam (Last PAP= 11/13/2023 negative //Last MAMMO= past due //Last Colonoscopy= ???//Ginny DOYLE MA, II/).  54 year old patient presents for well woman exam.   Discussed need for mammogram, colon screening.   Some increases in hot flushes since Mirena removed.  No bleeding.       Review of Systems   All other systems reviewed and are negative.    Objective   Physical Exam  Constitutional:       Appearance: Normal appearance.   HENT:      Head: Normocephalic and atraumatic.      Right Ear: External ear normal.      Left Ear: External ear normal.      Nose: Nose normal.      Mouth/Throat:      Mouth: Mucous membranes are moist.   Eyes:      Extraocular Movements: Extraocular movements intact.   Neck:      Thyroid: No thyroid mass or thyromegaly.   Cardiovascular:      Rate and Rhythm: Normal rate and regular rhythm.      Heart sounds: Normal heart sounds.   Pulmonary:      Effort: Pulmonary effort is normal.      Breath sounds: Normal breath sounds.   Chest:   Breasts:     Right: Normal.      Left: Normal.   Abdominal:      General: Abdomen is flat. Bowel sounds are normal.      Palpations: Abdomen is soft.      Tenderness: There is no abdominal tenderness. There is no right CVA tenderness or left CVA tenderness.   Genitourinary:     General: Normal vulva.      Exam position: Lithotomy position.      Labia:         Right: No lesion.         Left: No lesion.       Urethra: No prolapse, urethral swelling or urethral lesion.      Vagina: Normal.      Cervix: Normal.      Uterus: Normal.       Adnexa: Right adnexa normal and left adnexa normal.   Musculoskeletal:         General: Normal range of motion.      Right shoulder: Normal.      Left shoulder: Normal.      Cervical back: Normal, normal range of motion and neck supple.      Thoracic back: Normal.      Lumbar back: Normal.      Right hip: Normal.      Left hip: Normal.      Right  upper leg: Normal.      Left upper leg: Normal.      Right knee: Normal.      Left knee: Normal.      Right lower leg: Normal.      Left lower leg: Normal.   Lymphadenopathy:      Upper Body:      Right upper body: No axillary adenopathy.      Left upper body: No axillary adenopathy.      Lower Body: No right inguinal adenopathy. No left inguinal adenopathy.   Skin:     General: Skin is warm and dry.   Neurological:      General: No focal deficit present.      Mental Status: She is alert and oriented to person, place, and time.      Cranial Nerves: Cranial nerves 2-12 are intact.      Motor: Motor function is intact.   Psychiatric:         Attention and Perception: Attention and perception normal.         Mood and Affect: Mood and affect normal.         Behavior: Behavior normal.         Cognition and Memory: Cognition normal.         Judgment: Judgment normal.       Assessment/Plan   Problem List Items Addressed This Visit    None  Visit Diagnoses         Codes    Well woman exam with routine gynecological exam    -  Primary Z01.419             Megan Avalos MD 02/21/25 8:44 AM

## 2025-03-19 ENCOUNTER — PATIENT MESSAGE (OUTPATIENT)
Dept: CARE COORDINATION | Facility: CLINIC | Age: 55
End: 2025-03-19
Payer: COMMERCIAL

## 2025-04-11 DIAGNOSIS — Z00.6 RESEARCH EXAM: Primary | ICD-10-CM

## 2025-04-18 ENCOUNTER — LAB (OUTPATIENT)
Dept: LAB | Facility: HOSPITAL | Age: 55
End: 2025-04-18
Payer: COMMERCIAL

## 2025-04-18 ENCOUNTER — HOSPITAL ENCOUNTER (OUTPATIENT)
Dept: RADIOLOGY | Facility: HOSPITAL | Age: 55
Discharge: HOME | End: 2025-04-18
Payer: COMMERCIAL

## 2025-04-18 DIAGNOSIS — Z00.6 ENCOUNTER FOR EXAMINATION FOR NORMAL COMPARISON AND CONTROL IN CLINICAL RESEARCH PROGRAM: Primary | ICD-10-CM

## 2025-04-18 DIAGNOSIS — Z00.6 RESEARCH EXAM: ICD-10-CM

## 2025-04-18 LAB
EST. AVERAGE GLUCOSE BLD GHB EST-MCNC: 120 MG/DL
HBA1C MFR BLD: 5.8 % (ref ?–5.7)
LDLC SERPL DIRECT ASSAY-MCNC: 171 MG/DL (ref 0–129)

## 2025-04-18 PROCEDURE — 83036 HEMOGLOBIN GLYCOSYLATED A1C: CPT

## 2025-04-18 PROCEDURE — 83721 ASSAY OF BLOOD LIPOPROTEIN: CPT

## 2025-04-18 PROCEDURE — 75571 CT HRT W/O DYE W/CA TEST: CPT

## 2025-04-18 PROCEDURE — 36415 COLL VENOUS BLD VENIPUNCTURE: CPT

## 2025-04-22 ENCOUNTER — TELEPHONE (OUTPATIENT)
Dept: CARDIOLOGY | Facility: CLINIC | Age: 55
End: 2025-04-22
Payer: COMMERCIAL

## 2025-04-29 ENCOUNTER — DOCUMENTATION (OUTPATIENT)
Dept: CASE MANAGEMENT | Facility: HOSPITAL | Age: 55
End: 2025-04-29
Payer: COMMERCIAL

## 2025-04-29 NOTE — PROGRESS NOTES
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 Introduced the PAL2 Program.   Set up and review of RxCap remote monitoring devices. Participant's SMART Goals Identified.     Baseline Blood Pressure: 145/90 p-81 (lt) arm 136/89 P-18 (RT) arm   Baseline Weight: 234lbs.  Primary Care Doctor: Home Rodriges    Social Determinants of Health Assessed and Addressed.     SDoH Learning: AHA Handout How to Manage BP and CDC Handout Move Your Way Provided and Reviewed.     Follow Up      Next session scheduled for: [Date and Time]     The client was seen today and PAL 2 intervention low arm was delivered for Session 1. The client received their Rx Cap Kit. The client was fluent during the teach back. A release of information for [facility] was signed.  SHoD screener was completed.  No SDoH was identified at this time. The client score (0) on her PHQ9. The client is an overall healthy person. The client stated that she is at the beginning of menopause like Sx. The client elevated BP here of late per the client. The client denies having diabetes but does have high cholesterol levels. Because of side effects she is trying to lower her numbers with diet and exercise. The client have not set any clear goals outside of weight loss. The client is currently weighing 234lbs and would like to weigh under 200 lbs.The client has tried Adipex in the past with good results but is limited because of the classification of the medication and  recommended use. The client has a bust work schedule which impacts the late night meals. The client was advise to change her meal choice or not to go to sleep until 2 hours after meals. The client has agreed to meet with me in MAY for her next PAL 2 session

## 2025-05-13 ENCOUNTER — PATIENT OUTREACH (OUTPATIENT)
Dept: CASE MANAGEMENT | Facility: HOSPITAL | Age: 55
End: 2025-05-13
Payer: COMMERCIAL

## 2025-05-20 ENCOUNTER — DOCUMENTATION (OUTPATIENT)
Dept: CASE MANAGEMENT | Facility: HOSPITAL | Age: 55
End: 2025-05-20
Payer: COMMERCIAL

## 2025-05-21 NOTE — PROGRESS NOTES
PAL2 [place] Follow Up    CH[facility] PAL2 Session Start Time:  3:07 pm  CHW PAL2 Session Stop Time:  4:44 pm    Reviewed SMART goals and progress from last session.   Reviewed blood pressure, activity level, weight, and sleep from remote monitoring devices.   Reviewed participant's medications and management of medications.     Social Determinants of Health Assessed and Addressed.      SDoH Learning: Healthy Eating  Follow Up   Next session scheduled for: [Date and Time]     The client and I met for her 2 PAL session. The client and R/S for later in the day because the client. We went over her BP measurements that was given to me by  the client overall her numbers were good, the highest being 131/89, the weights are stable at 238#'s, here average sleep 7 hrs nightly,her average steps weekly was 3334 steps. The client states her diet changes are moving slow and will refocus on eating healthier after her vacation and holiday.  The client and I have agreed to meet next for PAL 2 session

## 2025-06-05 DIAGNOSIS — B37.9 YEAST INFECTION: Primary | ICD-10-CM

## 2025-06-05 RX ORDER — FLUCONAZOLE 150 MG/1
150 TABLET ORAL ONCE
Qty: 1 TABLET | Refills: 0 | Status: SHIPPED | OUTPATIENT
Start: 2025-06-05 | End: 2025-06-07

## 2025-06-06 ENCOUNTER — PHARMACY VISIT (OUTPATIENT)
Dept: PHARMACY | Facility: CLINIC | Age: 55
End: 2025-06-06
Payer: COMMERCIAL

## 2025-06-06 PROCEDURE — RXMED WILLOW AMBULATORY MEDICATION CHARGE

## 2025-07-25 ENCOUNTER — DOCUMENTATION (OUTPATIENT)
Dept: CASE MANAGEMENT | Facility: HOSPITAL | Age: 55
End: 2025-07-25
Payer: COMMERCIAL

## 2025-07-25 NOTE — PROGRESS NOTES
PAL2 [place] Follow Up    CH[facility] PAL2 Session Start Time:  10:03 am  CHW PAL2 Session Stop Time: 10;42 am    Reviewed SMART goals and progress from last session.   Reviewed blood pressure, activity level, weight, and sleep from remote monitoring devices.   Reviewed participant's medications and management of medications.     Social Determinants of Health Assessed and Addressed.      SDoH Learning:  None      Follow Up   Next session scheduled for: [Date and Time]     The client is doing well. The client BP is WNL. The client's biggest frustration is her not ability to lose weight. The client was given a new PCP ( Jhonny Nation).  Per the patient request I will find out the ETA on the soonest visit with Dr. Rodriguez. The client stated she is sleeping better with the new mattress. The client has increased her water intake. The client has agreed to meet in August for our next visit. She currently does not have any needs or task for me.

## 2025-08-20 ENCOUNTER — DOCUMENTATION (OUTPATIENT)
Dept: CASE MANAGEMENT | Facility: HOSPITAL | Age: 55
End: 2025-08-20
Payer: COMMERCIAL

## 2026-02-27 ENCOUNTER — APPOINTMENT (OUTPATIENT)
Dept: OBSTETRICS AND GYNECOLOGY | Facility: CLINIC | Age: 56
End: 2026-02-27
Payer: COMMERCIAL